# Patient Record
(demographics unavailable — no encounter records)

---

## 2024-10-28 NOTE — DISCUSSION/SUMMARY
[FreeTextEntry1] : 1. Asthma. Albuterol prn,use explained.  Inhaled steroid if needed for future episode.  Pulmonary referral.   2. Food allergies, environ allergies. To allergist.  Epipen rx given

## 2024-10-28 NOTE — HISTORY OF PRESENT ILLNESS
[FreeTextEntry6] : Here with mother's sister Mrs Lim. Had allergic reaction outdoors in school, had a reaction to an environmental allergen - face got tight and had trouble breathing from lungs.  Last night taken to UC as was feeling tight again and had trouble breathing , not normal for her. Albuterol given, helps her.   Food allergies, metal contact reactions.   Aunt says mom wants epipen, asthma tx for home nad school.

## 2024-12-04 NOTE — PHYSICAL EXAM
[NL] : moves all extremities x4, warm, well perfused x4 [de-identified] : 2 x 1 cm about, looks like a burn, red margin, purple interior, few tiny blisters in center, no pus, no cellulitis around it, well demarcated.  On other part of R wrist scar from previous lesion. L earlobe normal. Rest of skin nl.

## 2024-12-04 NOTE — DISCUSSION/SUMMARY
[FreeTextEntry1] : Unusual skin lesion (burn like) now. Otherwise child now looks well.  Recurrent lesions every few months associated with systemic sx, nausea and bad headache Analey says.  Child will not eat meat, restricted diet, vegan. Will not take vitamin mother says. Self inflicted lesions a possibility although denied by mother and pt.   Labs given Referred to rheumatology.  Nutritionist recommended, evaluate diet to see if any nutritional deficiencies that could be causing this.  Advised take photos, did here with pt phone camera. To show consultants.  Advised mother to gather all info, all derm consults and pathology reports, to bring to show me and for the rheumatologist.  If headaches frequent or severe refer to neurologist.

## 2024-12-04 NOTE — HISTORY OF PRESENT ILLNESS
[FreeTextEntry6] : Mother here States that Rosanne has had episodes of burn like lesions on various locations on her body that come and go every few months. Has had them on her L earlobe, on R wrist in different location than the one there now today.  Has had a few on her feet child says.  Child says that derm has biopsied the lesions 4 times, 2 were active. Mother complains they see the lesions when they are healing as takes a month to get appts. (Not [facility]). When gets these lesions has nausea and bad headaches with it.   Now has a new lesion on her R wrist.   Conversation in private: Denies cigarettes, JUUL,  ETOH,THC, drug abuse. Denies depression, anxiety, suicidal ideation or act.  Not sexually active. No molestation, abuse, bullying. No cyber bullying.  No cutting, no eating disorder activity.  Denies that she might be doing these injuries to herself by burning or rubbing.  Says she is fearful even of taking blood, would not hurt herself.  Mother and child say she lives a quiet life , does not go out much. Mother says she knows where child is all the time.

## 2025-03-26 NOTE — IMPRESSION
[Mild] : (mild) [FreeTextEntry1] : Suggestion of restrictive pattern which could be secondary to poor effort  - will try to do plethysmography FVC 64% pred  FEV1 65% pred, FEV1/FVC 92% [id number] 78% pred

## 2025-03-26 NOTE — IMPRESSION
[Mild] : (mild) [FreeTextEntry1] : Suggestion of restrictive pattern which could be secondary to poor effort  - will try to do plethysmography FVC 64% pred  FEV1 65% pred, FEV1/FVC 92% [id number] 78% pred

## 2025-03-26 NOTE — CONSULT LETTER
[Dear  ___] : Dear  [unfilled], [Consult Letter:] : I had the pleasure of evaluating your patient, [unfilled]. [Please see my note below.] : Please see my note below. [Consult Closing:] : Thank you very much for allowing me to participate in the care of this patient.  If you have any questions, please do not hesitate to contact me. [Sincerely,] : Sincerely, [FreeTextEntry2] : Dr. Eva Del Angel [FreeTextEntry3] :   Marybel Noguera MD, ATSF Chief, Division of Pediatric Pulmonary and CF Center  of Pediatrics [facility] at [facility]

## 2025-03-26 NOTE — HISTORY OF PRESENT ILLNESS
[(# ___ in the past year)] : hospitalized [unfilled] times in the past year [Dyspnea on Exertion] : dyspnea on exertion [< or = 2 days/wk] : < than or = 2 days/wk [0 x/month] : 0 x/month [Some Limitation] : some limitation [> or = 2 days/wk] : > than or = 2 days/week [0 - 1/year] : 0 - 1/year [16 - 19] : 16 - 19 [FreeTextEntry1] : This is a 17 year old female here for evaluation of  shortness of breath with exercise  6 months ago started wheezing and needed an asthma pump. Was in gym class outdoors. WAs complaining of shortness of breath, cough and chest tightness after several minutes of exertion  Did a patch test at allergist. Allergic to many things.- primarily dyes and some environmental allergens  FT, csection (mom had gestational diabetes) 8lb 10 oz. NICU x 1 days (not latching). Never on oxygen or intubated. Cats outdoors, but no pets in the house, no carpets in the house, no smoke exposure.  Age of onset of respiratory Sx: This past fall  DX with asthma/RAD: yes  Hospitalization/year: denies ED visits/year: denies  Steroid courses/year: denies Last oral steroid course: denies Typical Sx: +cough +wheeze +shortness of breath Typical trigger: +exercise (with gym), +allergic trigger Time of year: +all-year  Response to albuterol: typically yes  Response to oral steroids: n/a Using spacer:   No    Spacer technique: described as : shake it, put inhaler in spacer, pump, exhale, then breathe (does not hold breath) Interval Sx: +exercise intolerance Atopic Sx: no eczema, no seasonal allergies +environmental allergies (shea butter, cocoa butter, coconut oil, cobalt, red, orange, blue and pink dyes, all red fruits, dogs)  +food allergies. Seen by allergy for rash - reportedly allergic in nature  GI Sx: no reflux Sx ENT Sx: no chronic congestion no snoring  Family history: asthma atopy (grandfather had asthma and COPD) Current Sx: runny nose (did not take zyrtec this AM  Meds: Fluticasone Propionate 44 2 puffs BID- prescribed in November , Claritin, Zyrtec  COVID exposure: denies, never had COVID COVID vaccine: yes initial series flu vaccine: denies  Modified asthma predictive index: parental history of asthma: denies physician diagnosed atopic dermatitis: denies environmental allergies: grass pollen peripheral eosinophilia: denies food allergies: see above

## 2025-03-26 NOTE — CONSULT LETTER
[Dear  ___] : Dear  [unfilled], [Consult Letter:] : I had the pleasure of evaluating your patient, [unfilled]. [Please see my note below.] : Please see my note below. [Consult Closing:] : Thank you very much for allowing me to participate in the care of this patient.  If you have any questions, please do not hesitate to contact me. [Sincerely,] : Sincerely, [FreeTextEntry2] : Dr. Eva Del Angel [FreeTextEntry3] :   Marybel Noguera MD, ATSF Chief, Division of Pediatric Pulmonary and CF Center  of Pediatrics [facility] of [facility] at [facility]

## 2025-03-26 NOTE — REASON FOR VISIT
[Initial Evaluation] : an initial evaluation of [Mother] : mother [Asthma/RAD] : asthma/RAD [Exercise Induced Dyspnea] : exercise induced dyspnea [Medical Records] : medical records

## 2025-03-26 NOTE — REVIEW OF SYSTEMS
[NI] : Genitourinary  [Nl] : Endocrine [Wheezing] : wheezing [Cough] : cough [Shortness of Breath] : shortness of breath [Rash] : rash [Immunizations are up to date] : Immunizations are up to date [Frequent URIs] : no frequent upper respiratory infections [Snoring] : no snoring [Heart Disease] : no heart disease [Reflux] : no reflux [Influenza Vaccine this Past Year] : no influenza vaccine this past year

## 2025-03-26 NOTE — PHYSICAL EXAM
[Well Nourished] : well nourished [Well Developed] : well developed [Alert] : ~L alert [Active] : active [Normal Breathing Pattern] : normal breathing pattern [No Respiratory Distress] : no respiratory distress [No Allergic Shiners] : no allergic shiners [No Drainage] : no drainage [No Conjunctivitis] : no conjunctivitis [No Nasal Drainage] : no nasal drainage [No Oral Pallor] : no oral pallor [No Oral Cyanosis] : no oral cyanosis [Non-Erythematous] : non-erythematous [No Exudates] : no exudates [No Tonsillar Enlargement] : no tonsillar enlargement [No Stridor] : no stridor [Absence Of Retractions] : absence of retractions [Symmetric] : symmetric [Good Expansion] : good expansion [No Acc Muscle Use] : no accessory muscle use [Good aeration to bases] : good aeration to bases [Equal Breath Sounds] : equal breath sounds bilaterally [No Crackles] : no crackles [No Rhonchi] : no rhonchi [No Wheezing] : no wheezing [Normal Sinus Rhythm] : normal sinus rhythm [No Heart Murmur] : no heart murmur [Soft, Non-Tender] : soft, non-tender [No Hepatosplenomegaly] : no hepatosplenomegaly [Non Distended] : was not ~L distended [Abdomen Mass (___ Cm)] : no abdominal mass palpated [Full ROM] : full range of motion [No Clubbing] : no clubbing [Capillary Refill < 2 secs] : capillary refill less than two seconds [No Cyanosis] : no cyanosis [No Petechiae] : no petechiae [No Kyphoscoliosis] : no kyphoscoliosis [No Contractures] : no contractures [Alert and  Oriented] : alert and oriented [No Abnormal Focal Findings] : no abnormal focal findings [Normal Muscle Tone And Reflexes] : normal muscle tone and reflexes [No Birth Marks] : no birth marks [No Rashes] : no rashes [No Skin Lesions] : no skin lesions [FreeTextEntry4] : congested nasal mucosa  [FreeTextEntry5] : cobblestoned posterior pharynx

## 2025-03-27 NOTE — PHYSICAL EXAM

## 2025-03-27 NOTE — PHYSICAL EXAM

## 2025-03-27 NOTE — DISCUSSION/SUMMARY
[Normal Growth] : growth [Normal Development] : development  [No Elimination Concerns] : elimination [Continue Regimen] : feeding [Normal Sleep Pattern] : sleep [None] : no medical problems [Anticipatory Guidance Given] : Anticipatory guidance addressed as per the history of present illness section [No Vaccines] : no vaccines needed [No Medications] : ~He/She~ is not on any medications [Patient] : patient [Parent/Guardian] : Parent/Guardian [] : The components of the vaccine(s) to be administered today are listed in the plan of care. The disease(s) for which the vaccine(s) are intended to prevent and the risks have been discussed with the caretaker.  The risks are also included in the appropriate vaccination information statements which have been provided to the patient's caregiver.  The caregiver has given consent to vaccinate. [FreeTextEntry1] : Well child overall. Previous mild spinal asymmetry - now back looks normal.  Bexsero given Mothre says will return for HPV, does not want Flu Vaccine.  Needs another IPV, 4 yr old dose missing. Mother will return for this she says. Wants only one vaccine at a time.   Ref to ped rheum for episodes of fever nausea and then rash. Strongly advised mother to get consultation report from the [facility] that did rash biopsy, and also did allergy skin testing -pt and mom do not know results. I will call  her if mother if she wants and signs record release (not done yet).  Positive FH cardiac - referred to cardiology.   to allergist re many food allergies, OAS.   86520- 25 allergies, cardiac, unusual fevers, immuniz counselling etc  Discussed teen safety issues. Dangers of substance abuse. Fentanyl risks - may be in medication or candy.  Resisting peer pressure. Internet, computer precautions, safety. 1814 reviewed, healthy eating. Staying safe.  If situation makes them uncomfortable get right out of it and tell a trusted adult, if needs help come to see us. Always wear a seat belt. Helmet for biking, skiing, scooters.

## 2025-03-27 NOTE — HISTORY OF PRESENT ILLNESS
[Mother] : mother [Normal] : normal [NO] : No [FreeTextEntry1] : 17 yr old in 11th grade.  nl menses.   Seasonal allergy - cherries raspberries watermelon, red apples. Hives, lips swollen, eyelid swelling. Takes zyrtec or benadryl when gets earliest rxn.  Takes zyrtec or claritin. Has epipen , not always with her.  Saw Pulm today. Wheezed once at gym lately.   Vegetarian.   Mom gives B12, Vit C.  Every 6 mos gets fever, nausea and 2 d later rash on hands, arms - derm in [place] did allergy tests, and lesion skin bx - family not clear about results.   Multiple foods cause hives and lip swelling.  pink, red dye, some fruits, some oils Has epipen, not always with her.   Conversation in private: Denies cigarettes, JUUL,  ETOH,THC, drug abuse. Denies depression, anxiety, suicidal ideation or act.  Not sexually active. No molestation, abuse, bullying. No cyber bullying.  No cutting, no eating disorder activity. Cis gender identity, attracted to opposite gender.

## 2025-03-27 NOTE — RISK ASSESSMENT
[Have you ever had exercise-related chest pain or shortness of breath?] : Have you ever had exercise-related chest pain or shortness of breath? Yes [Has anyone in your immediate family (parents, grandparents, siblings) or other more distant relatives (aunts, uncles, cousins)  of heart] : Has anyone in your immediate family (parents, grandparents, siblings) or other more distant relatives (aunts, uncles, cousins)  of heart problems or had an unexpected sudden death before age 50 (This would include unexpected drownings, unexplained car accidents in which the relative was driving or sudden infant death syndrome.)? Yes [Are you related to anyone with hypertrophic cardiomyopathy or hypertrophic obstructive cardiomyopathy, Marfan syndrome, arrhythmogenic] : Are you related to anyone with hypertrophic cardiomyopathy or hypertrophic obstructive cardiomyopathy, Marfan syndrome, arrhythmogenic right ventricular cardiomyopathy, long QT syndrome, short QT syndrome, Brugada syndrome or catecholaminergic polymorphic ventricular tachycardia, or anyone younger than 50 years with a pacemaker or implantable defibrillator? Yes [Increased risk of SCA or SCD] : Increased risk of SCA or SCD  [Yes] : Patient consents to screening. [PHQ-9 Negative - No further assessment needed] : PHQ-9 Negative - No further assessment needed [Have you ever fainted, passed out or had an unexplained seizure suddenly and without warning, especially during exercise or in response] : Have you ever fainted, passed out or had an unexplained seizure suddenly and without warning, especially during exercise or in response to sudden loud noises such as doorbells, alarm clocks and ringing telephones? No

## 2025-03-27 NOTE — DISCUSSION/SUMMARY
[Normal Growth] : growth [Normal Development] : development  [No Elimination Concerns] : elimination [Continue Regimen] : feeding [Normal Sleep Pattern] : sleep [None] : no medical problems [Anticipatory Guidance Given] : Anticipatory guidance addressed as per the history of present illness section [No Vaccines] : no vaccines needed [No Medications] : ~He/She~ is not on any medications [Patient] : patient [Parent/Guardian] : Parent/Guardian [] : The components of the vaccine(s) to be administered today are listed in the plan of care. The disease(s) for which the vaccine(s) are intended to prevent and the risks have been discussed with the caretaker.  The risks are also included in the appropriate vaccination information statements which have been provided to the patient's caregiver.  The caregiver has given consent to vaccinate. [FreeTextEntry1] : Well child overall. Previous mild spinal asymmetry - now back looks normal.  Bexsero given Mothre says will return for HPV, does not want Flu Vaccine.  Needs another IPV, 4 yr old dose missing. Mother will return for this she says. Wants only one vaccine at a time.   Ref to ped rheum for episodes of fever nausea and then rash. Strongly advised mother to get consultation report from the [facility] that did rash biopsy, and also did allergy skin testing -pt and mom do not know results. I will call  her if mother if she wants and signs record release (not done yet).  Positive FH cardiac - referred to cardiology.   to allergist re many food allergies, OAS.   42779- 25 allergies, cardiac, unusual fevers, immuniz counselling etc  Discussed teen safety issues. Dangers of substance abuse. Fentanyl risks - may be in medication or candy.  Resisting peer pressure. Internet, computer precautions, safety. 7746 reviewed, healthy eating. Staying safe.  If situation makes them uncomfortable get right out of it and tell a trusted adult, if needs help come to see us. Always wear a seat belt. Helmet for biking, skiing, scooters.

## 2025-07-17 NOTE — CONSULT LETTER
[Dear  ___] : Dear  [unfilled], [Consult Letter:] : I had the pleasure of evaluating your patient, [unfilled]. [Please see my note below.] : Please see my note below. [Consult Closing:] : Thank you very much for allowing me to participate in the care of this patient.  If you have any questions, please do not hesitate to contact me. [Sincerely,] : Sincerely, [FreeTextEntry2] : Dr. Eva Del Angel [FreeTextEntry3] :   Marybel Noguera MD, ATSF Chief, Division of Pediatric Pulmonary and CF Center  of Pediatrics [facility] of [facility] at [facility]

## 2025-07-17 NOTE — REASON FOR VISIT
[Initial Evaluation] : an initial evaluation of [Asthma/RAD] : asthma/RAD [Exercise Induced Dyspnea] : exercise induced dyspnea [Mother] : mother [Medical Records] : medical records [Routine Follow-Up] : a routine follow-up visit for

## 2025-07-17 NOTE — CONSULT LETTER
[Dear  ___] : Dear  [unfilled], [Consult Letter:] : I had the pleasure of evaluating your patient, [unfilled]. [Please see my note below.] : Please see my note below. [Consult Closing:] : Thank you very much for allowing me to participate in the care of this patient.  If you have any questions, please do not hesitate to contact me. [Sincerely,] : Sincerely, [FreeTextEntry2] : Dr. Eva Del Angel [FreeTextEntry3] :   Marybel Noguera MD, ATSF Chief, Division of Pediatric Pulmonary and CF Center  of Pediatrics [facility] of [facility] at [facility]

## 2025-07-17 NOTE — CONSULT LETTER
[Dear  ___] : Dear  [unfilled], [Consult Letter:] : I had the pleasure of evaluating your patient, [unfilled]. [Please see my note below.] : Please see my note below. [Consult Closing:] : Thank you very much for allowing me to participate in the care of this patient.  If you have any questions, please do not hesitate to contact me. [Sincerely,] : Sincerely, [FreeTextEntry2] : Dr. Eva Del Angel [FreeTextEntry3] :   Marybel Noguera MD, ATSF Chief, Division of Pediatric Pulmonary and CF Center  of Pediatrics [facility] of [facility] at [facility]

## 2025-07-17 NOTE — HISTORY OF PRESENT ILLNESS
[(# ___ in the past year)] : hospitalized [unfilled] times in the past year [Dyspnea on Exertion] : dyspnea on exertion [< or = 2 days/wk] : < than or = 2 days/wk [0 x/month] : 0 x/month [Some Limitation] : some limitation [0 - 1/year] : 0 - 1/year [16 - 19] : 16 - 19 [FreeTextEntry1] : Moderate Persistent Asthma, Exercise-Induced Bronchospasm, Abnormal PFT  7/2025 visit. Last seen 3/2025. Interval Hx: Never started Symbicort because it was not available at the pharmacy. Still has a hard time taking a deep breath. Less active now that it is the summer time  Recent ER visits/hospitalizations: none  Last oral steroid course:none  Cough, SOB, or wheeze/ nighttime awakening with cough/wheeze: shortness of breath with exercise Daily meds: Symbicort 80 2 puffs BID, Claritin/Zyrtec PRN, Albuterol PRN and before exercise Last used rescue: used a few days ago - difficulty breathing, uses albuterol 2-3 times per week for exercise  Allergic rhinitis symptoms: congestion - on Xyzal - will have congestion if she stops  Flu vaccine:no    This is a 17 year old female here for evaluation of  shortness of breath with exercise  6 months ago started wheezing and needed an asthma pump. Was in gym class outdoors. WAs complaining of shortness of breath, cough and chest tightness after several minutes of exertion  Did a patch test at allergist. Allergic to many things.- primarily dyes and some environmental allergens  FT, csection (mom had gestational diabetes) 8lb 10 oz. NICU x 1 days (not latching). Never on oxygen or intubated. Cats outdoors, but no pets in the house, no carpets in the house, no smoke exposure.  Age of onset of respiratory Sx: This past fall  DX with asthma/RAD: yes  Hospitalization/year: denies ED visits/year: denies  Steroid courses/year: denies Last oral steroid course: denies Typical Sx: +cough +wheeze +shortness of breath Typical trigger: +exercise (with gym), +allergic trigger Time of year: +all-year  Response to albuterol: typically yes  Response to oral steroids: n/a Using spacer:   No    Spacer technique: described as : shake it, put inhaler in spacer, pump, exhale, then breathe (does not hold breath) Interval Sx: +exercise intolerance Atopic Sx: no eczema, no seasonal allergies +environmental allergies (shea butter, cocoa butter, coconut oil, cobalt, red, orange, blue and pink dyes, all red fruits, dogs)  +food allergies. Seen by allergy for rash - reportedly allergic in nature  GI Sx: no reflux Sx ENT Sx: no chronic congestion no snoring  Family history: asthma atopy (grandfather had asthma and COPD) Current Sx: runny nose (did not take zyrtec this AM  Meds: Fluticasone Propionate 44 2 puffs BID- prescribed in November , Claritin,/Zyrtec  COVID exposure: denies, never had COVID COVID vaccine: yes initial series flu vaccine: denies  Modified asthma predictive index: parental history of asthma: denies physician diagnosed atopic dermatitis: denies environmental allergies: grass pollen peripheral eosinophilia: denies food allergies: see above  [(# ___since the last visit)] : [unfilled] visits to the emergency room since the last visit [(# ___ since the last visit)] : hospitalized [unfilled] times since the last visit [> or = 2 days/wk] : > than or = 2 days/wk